# Patient Record
Sex: MALE | Race: OTHER
[De-identification: names, ages, dates, MRNs, and addresses within clinical notes are randomized per-mention and may not be internally consistent; named-entity substitution may affect disease eponyms.]

---

## 2020-01-02 ENCOUNTER — HOSPITAL ENCOUNTER (INPATIENT)
Dept: HOSPITAL 72 - EMR | Age: 65
LOS: 2 days | Discharge: LEFT BEFORE DISCHARGE | DRG: 378 | End: 2020-01-04
Payer: SELF-PAY

## 2020-01-02 VITALS — DIASTOLIC BLOOD PRESSURE: 64 MMHG | SYSTOLIC BLOOD PRESSURE: 112 MMHG

## 2020-01-02 VITALS — WEIGHT: 145 LBS | HEIGHT: 68 IN | BODY MASS INDEX: 21.98 KG/M2

## 2020-01-02 VITALS — DIASTOLIC BLOOD PRESSURE: 66 MMHG | SYSTOLIC BLOOD PRESSURE: 113 MMHG

## 2020-01-02 VITALS — DIASTOLIC BLOOD PRESSURE: 80 MMHG | SYSTOLIC BLOOD PRESSURE: 100 MMHG

## 2020-01-02 VITALS — SYSTOLIC BLOOD PRESSURE: 119 MMHG | DIASTOLIC BLOOD PRESSURE: 67 MMHG

## 2020-01-02 DIAGNOSIS — D62: ICD-10-CM

## 2020-01-02 DIAGNOSIS — E78.5: ICD-10-CM

## 2020-01-02 DIAGNOSIS — K92.2: Primary | ICD-10-CM

## 2020-01-02 DIAGNOSIS — E03.9: ICD-10-CM

## 2020-01-02 DIAGNOSIS — I10: ICD-10-CM

## 2020-01-02 DIAGNOSIS — D64.9: ICD-10-CM

## 2020-01-02 DIAGNOSIS — E11.9: ICD-10-CM

## 2020-01-02 LAB
ADD MANUAL DIFF: NO
ALBUMIN SERPL-MCNC: 3.4 G/DL (ref 3.4–5)
ALBUMIN/GLOB SERPL: 1.1 {RATIO} (ref 1–2.7)
ALP SERPL-CCNC: 57 U/L (ref 46–116)
ALT SERPL-CCNC: 47 U/L (ref 12–78)
ANION GAP SERPL CALC-SCNC: 13 MMOL/L (ref 5–15)
APPEARANCE UR: CLEAR
APTT BLD: 21 SEC (ref 23–33)
APTT PPP: YELLOW S
AST SERPL-CCNC: 15 U/L (ref 15–37)
BASOPHILS NFR BLD AUTO: 0.7 % (ref 0–2)
BILIRUB SERPL-MCNC: 0.2 MG/DL (ref 0.2–1)
BUN SERPL-MCNC: 40 MG/DL (ref 7–18)
CALCIUM SERPL-MCNC: 8.3 MG/DL (ref 8.5–10.1)
CHLORIDE SERPL-SCNC: 105 MMOL/L (ref 98–107)
CO2 SERPL-SCNC: 24 MMOL/L (ref 21–32)
CREAT SERPL-MCNC: 1.2 MG/DL (ref 0.55–1.3)
EOSINOPHIL NFR BLD AUTO: 1.7 % (ref 0–3)
ERYTHROCYTE [DISTWIDTH] IN BLOOD BY AUTOMATED COUNT: 10.8 % (ref 11.6–14.8)
GLOBULIN SER-MCNC: 3.2 G/DL
GLUCOSE UR STRIP-MCNC: NEGATIVE MG/DL
HCT VFR BLD CALC: 25.1 % (ref 42–52)
HGB BLD-MCNC: 8.9 G/DL (ref 14.2–18)
INR PPP: 0.9 (ref 0.9–1.1)
KETONES UR QL STRIP: NEGATIVE
LEUKOCYTE ESTERASE UR QL STRIP: (no result)
LYMPHOCYTES NFR BLD AUTO: 22.7 % (ref 20–45)
MCV RBC AUTO: 88 FL (ref 80–99)
MONOCYTES NFR BLD AUTO: 7.7 % (ref 1–10)
NEUTROPHILS NFR BLD AUTO: 67.3 % (ref 45–75)
NITRITE UR QL STRIP: NEGATIVE
PH UR STRIP: 5 [PH] (ref 4.5–8)
PLATELET # BLD: 239 K/UL (ref 150–450)
POTASSIUM SERPL-SCNC: 3.8 MMOL/L (ref 3.5–5.1)
PROT UR QL STRIP: (no result)
RBC # BLD AUTO: 2.86 M/UL (ref 4.7–6.1)
SODIUM SERPL-SCNC: 142 MMOL/L (ref 136–145)
SP GR UR STRIP: 1.01 (ref 1–1.03)
UROBILINOGEN UR-MCNC: NORMAL MG/DL (ref 0–1)
WBC # BLD AUTO: 14.1 K/UL (ref 4.8–10.8)

## 2020-01-02 PROCEDURE — 96361 HYDRATE IV INFUSION ADD-ON: CPT

## 2020-01-02 PROCEDURE — 86900 BLOOD TYPING SEROLOGIC ABO: CPT

## 2020-01-02 PROCEDURE — 80053 COMPREHEN METABOLIC PANEL: CPT

## 2020-01-02 PROCEDURE — 71045 X-RAY EXAM CHEST 1 VIEW: CPT

## 2020-01-02 PROCEDURE — 99285 EMERGENCY DEPT VISIT HI MDM: CPT

## 2020-01-02 PROCEDURE — 85007 BL SMEAR W/DIFF WBC COUNT: CPT

## 2020-01-02 PROCEDURE — 80048 BASIC METABOLIC PNL TOTAL CA: CPT

## 2020-01-02 PROCEDURE — 86901 BLOOD TYPING SEROLOGIC RH(D): CPT

## 2020-01-02 PROCEDURE — 86850 RBC ANTIBODY SCREEN: CPT

## 2020-01-02 PROCEDURE — 96374 THER/PROPH/DIAG INJ IV PUSH: CPT

## 2020-01-02 PROCEDURE — 80061 LIPID PANEL: CPT

## 2020-01-02 PROCEDURE — 83036 HEMOGLOBIN GLYCOSYLATED A1C: CPT

## 2020-01-02 PROCEDURE — 85025 COMPLETE CBC W/AUTO DIFF WBC: CPT

## 2020-01-02 PROCEDURE — 86920 COMPATIBILITY TEST SPIN: CPT

## 2020-01-02 PROCEDURE — 85730 THROMBOPLASTIN TIME PARTIAL: CPT

## 2020-01-02 PROCEDURE — 84484 ASSAY OF TROPONIN QUANT: CPT

## 2020-01-02 PROCEDURE — 82962 GLUCOSE BLOOD TEST: CPT

## 2020-01-02 PROCEDURE — 36415 COLL VENOUS BLD VENIPUNCTURE: CPT

## 2020-01-02 PROCEDURE — 85610 PROTHROMBIN TIME: CPT

## 2020-01-02 PROCEDURE — 93005 ELECTROCARDIOGRAM TRACING: CPT

## 2020-01-02 PROCEDURE — 83690 ASSAY OF LIPASE: CPT

## 2020-01-02 PROCEDURE — 84443 ASSAY THYROID STIM HORMONE: CPT

## 2020-01-02 PROCEDURE — 81003 URINALYSIS AUTO W/O SCOPE: CPT

## 2020-01-02 RX ADMIN — INSULIN ASPART SCH UNITS: 100 INJECTION, SOLUTION INTRAVENOUS; SUBCUTANEOUS at 22:35

## 2020-01-02 RX ADMIN — PANTOPRAZOLE SODIUM SCH MG: 40 INJECTION, POWDER, FOR SOLUTION INTRAVENOUS at 22:16

## 2020-01-02 NOTE — NUR
NURSE NOTES:

Received report from BALTAZAR Shin @ ER. The patient's belongings checked including cash and 
denture with the patient and two nurses and signed by two nurses. Medical, surgical, 
allergy, and social history taken by the nurse. Admitting EKG and medication reconciliation 
completed @ ER. The patient has R AC 18G intact and patent. The patient's skin is intact. 
The patient does not have POLST for advanced directive. The patient's vital signs taken on 
the floor. The patient's vital signs are stable. Notified Dr. Todd regarding admission 
for upper GI bleed. Notified Dr. Todd regarding abnormal lab and stable vital signs. Will 
wait for admission order. Will continue plan of care.

## 2020-01-02 NOTE — NUR
HAND-OFF: 

Report given to BALTAZAR Alvarez. The patient is resting on the bed without acute distress or 
shortness of breath. The patient's bed in the lowest position, call light in reach, and fall 
and aspiration precaution reinforced. IV site intact and patent. No admission order given 
from Dr. Todd. Endorsed plan of care.

## 2020-01-02 NOTE — EMERGENCY ROOM REPORT
History of Present Illness


General


Source:  Patient, EMS





Present Illness


HPI


Patient is a 64-year-old male brought in by EMS after increased hematemesis.  

Patient reportedly had been vomiting dark bloody material.  He reports having a 

prior history of diabetes as well as hypertension.  He denies any alcohol use 

since December 24.  Patient was sent in from a urgent care where he was noted 

to have some coffee-ground emesis approximately 200 cc.


Allergies:  


Coded Allergies:  


     No Known Allergies (Unverified , 1/2/20)





Patient History


Reviewed Nursing Documentation:  PMH: Agreed; PSxH: Agreed





Review of Systems


All Other Systems:  negative except mentioned in HPI





Physical Exam


Sp02 EP Interpretation:  reviewed, normal


General Appearance:  normal inspection, alert, GCS 15


Head:  atraumatic


ENT:  normal ENT inspection, hearing grossly normal, normal voice


Neck:  normal inspection, full range of motion, supple, no bony tend


Respiratory:  normal inspection, lungs clear, normal breath sounds, no 

respiratory distress, no retraction, no wheezing


Cardiovascular #1:  regular rate, rhythm, no edema


Gastrointestinal:  normal inspection, normal bowel sounds, non tender, soft, no 

guarding, no hernia


Genitourinary:  no CVA tenderness


Musculoskeletal:  normal inspection, back normal, normal range of motion


Neurologic:  alert, CNs III-XII nml as tested, oriented x3, responsive, speech 

normal, normal inspection


Psychiatric:  normal inspection, judgement/insight normal, mood/affect normal


Skin:  no rash





Medical Decision Making


Diagnostic Impression:  


 Primary Impression:  


 Upper GI bleeding


 Additional Impression:  


 Anemia


ER Course


Patient presented for hematemesis.  Differential diagnosis include was not 

limited to gastritis, ulcer, coagulopathy, esophageal varices among others.  

Because of complexity of patient's case laboratory tests and imaging studies 

were ordered.Patient's laboratory testing did show some evidence of anemia.  

Patient was advised of laboratory testing.  Due to patient's initial bleeding 

he was started on IV Protonix.  Dr. Todd was contacted for inpatient 

management due to need for inpatient monitoring and treatment.





Labs








Test


  1/2/20


14:09 1/2/20


15:11


 


White Blood Count


  14.1 K/UL


(4.8-10.8) 


 


 


Red Blood Count


  2.86 M/UL


(4.70-6.10) 


 


 


Hemoglobin


  8.9 G/DL


(14.2-18.0) 


 


 


Hematocrit


  25.1 %


(42.0-52.0) 


 


 


Mean Corpuscular Volume 88 FL (80-99)  


 


Mean Corpuscular Hemoglobin


  31.3 PG


(27.0-31.0) 


 


 


Mean Corpuscular Hemoglobin


Concent 35.7 G/DL


(32.0-36.0) 


 


 


Red Cell Distribution Width


  10.8 %


(11.6-14.8) 


 


 


Platelet Count


  239 K/UL


(150-450) 


 


 


Mean Platelet Volume


  6.8 FL


(6.5-10.1) 


 


 


Neutrophils (%) (Auto)


  67.3 %


(45.0-75.0) 


 


 


Lymphocytes (%) (Auto)


  22.7 %


(20.0-45.0) 


 


 


Monocytes (%) (Auto)


  7.7 %


(1.0-10.0) 


 


 


Eosinophils (%) (Auto)


  1.7 %


(0.0-3.0) 


 


 


Basophils (%) (Auto)


  0.7 %


(0.0-2.0) 


 


 


Prothrombin Time


  10.0 SEC


(9.30-11.50) 


 


 


Prothromb Time International


Ratio 0.9 (0.9-1.1) 


  


 


 


Activated Partial


Thromboplast Time 21 SEC (23-33) 


  


 


 


Sodium Level


  142 MMOL/L


(136-145) 


 


 


Potassium Level


  3.8 MMOL/L


(3.5-5.1) 


 


 


Chloride Level


  105 MMOL/L


() 


 


 


Carbon Dioxide Level


  24 MMOL/L


(21-32) 


 


 


Anion Gap


  13 mmol/L


(5-15) 


 


 


Blood Urea Nitrogen


  40 mg/dL


(7-18) 


 


 


Creatinine


  1.2 MG/DL


(0.55-1.30) 


 


 


Estimat Glomerular Filtration


Rate > 60 mL/min


(>60) 


 


 


Glucose Level


  181 MG/DL


() 


 


 


Calcium Level


  8.3 MG/DL


(8.5-10.1) 


 


 


Total Bilirubin


  0.2 MG/DL


(0.2-1.0) 


 


 


Aspartate Amino Transf


(AST/SGOT) 15 U/L (15-37) 


  


 


 


Alanine Aminotransferase


(ALT/SGPT) 47 U/L (12-78) 


  


 


 


Alkaline Phosphatase


  57 U/L


() 


 


 


Troponin I


  0.000 ng/mL


(0.000-0.056) 


 


 


Total Protein


  6.6 G/DL


(6.4-8.2) 


 


 


Albumin


  3.4 G/DL


(3.4-5.0) 


 


 


Globulin 3.2 g/dL  


 


Albumin/Globulin Ratio 1.1 (1.0-2.7)  


 


Lipase


  194 U/L


() 


 


 


Serum Alcohol < 3 mg/dL  


 


Urine Color  Yellow 


 


Urine Appearance  Clear 


 


Urine pH  5 (4.5-8.0) 


 


Urine Specific Gravity


  


  1.015


(1.005-1.035)


 


Urine Protein  1+ (NEGATIVE) 


 


Urine Glucose (UA)


  


  Negative


(NEGATIVE)


 


Urine Ketones


  


  Negative


(NEGATIVE)


 


Urine Blood


  


  Negative


(NEGATIVE)


 


Urine Nitrite


  


  Negative


(NEGATIVE)


 


Urine Bilirubin


  


  Negative


(NEGATIVE)


 


Urine Urobilinogen


  


  Normal MG/DL


(0.0-1.0)


 


Urine Leukocyte Esterase  1+ (NEGATIVE) 


 


Urine RBC


  


  0-2 /HPF (0 -


0)


 


Urine WBC


  


  2-4 /HPF (0 -


0)


 


Urine Squamous Epithelial


Cells 


  Few /LPF


(NONE/OCC)


 


Urine Bacteria


  


  Few /HPF


(NONE)








Status:  improved


Disposition:  ADMITTED AS INPATIENT


Condition:  Stable











Doroteo Rodas MD Jan 2, 2020 14:02

## 2020-01-02 NOTE — NUR
TRANSFER TO FLOOR:

Patient transferred to tele  as ordered, per ermd.   Report given to michael morales. 
 Belongings given to pt.

## 2020-01-02 NOTE — NUR
ED Nurse Note:



PT AMBULATED TO ED C/O NAUSEA VOMITING WITH BLOOD. PT REPORTS ADBOMEN PAIN. NO 
ACUTE DISTRESS NOTED. IV LINES ESTABLISHED, PATENT AND INTACT. BLOOD SPECIMEN 
SENT TO LAB

## 2020-01-02 NOTE — DIAGNOSTIC IMAGING REPORT
Indication: Cough

 

Technique: One view of the chest

 

Comparison: none

 

Findings: Lungs and pleural spaces are clear. Heart size is normal.

 

Impression: No acute process

## 2020-01-02 NOTE — NUR
ED Nurse Note:



PT VSS, PT IS COMFORTABLE IN BED; ON HIS PHONE. DENIES PAIN AT THIS TIME. 
AWAITING FURTHER ORDERS WILL CONTINUE TO MONITOR

## 2020-01-03 VITALS — SYSTOLIC BLOOD PRESSURE: 103 MMHG | DIASTOLIC BLOOD PRESSURE: 56 MMHG

## 2020-01-03 VITALS — DIASTOLIC BLOOD PRESSURE: 82 MMHG | SYSTOLIC BLOOD PRESSURE: 136 MMHG

## 2020-01-03 VITALS — DIASTOLIC BLOOD PRESSURE: 55 MMHG | SYSTOLIC BLOOD PRESSURE: 103 MMHG

## 2020-01-03 VITALS — SYSTOLIC BLOOD PRESSURE: 106 MMHG | DIASTOLIC BLOOD PRESSURE: 78 MMHG

## 2020-01-03 VITALS — DIASTOLIC BLOOD PRESSURE: 77 MMHG | SYSTOLIC BLOOD PRESSURE: 119 MMHG

## 2020-01-03 VITALS — DIASTOLIC BLOOD PRESSURE: 59 MMHG | SYSTOLIC BLOOD PRESSURE: 98 MMHG

## 2020-01-03 LAB
ADD MANUAL DIFF: YES
ANION GAP SERPL CALC-SCNC: 6 MMOL/L (ref 5–15)
BUN SERPL-MCNC: 27 MG/DL (ref 7–18)
CALCIUM SERPL-MCNC: 7.9 MG/DL (ref 8.5–10.1)
CHLORIDE SERPL-SCNC: 105 MMOL/L (ref 98–107)
CHOLEST SERPL-MCNC: 110 MG/DL (ref ?–200)
CO2 SERPL-SCNC: 30 MMOL/L (ref 21–32)
CREAT SERPL-MCNC: 1.1 MG/DL (ref 0.55–1.3)
ERYTHROCYTE [DISTWIDTH] IN BLOOD BY AUTOMATED COUNT: 11.6 % (ref 11.6–14.8)
HCT VFR BLD CALC: 19.7 % (ref 42–52)
HDLC SERPL-MCNC: 27 MG/DL (ref 40–60)
HGB BLD-MCNC: 7.2 G/DL (ref 14.2–18)
MCV RBC AUTO: 88 FL (ref 80–99)
PLATELET # BLD: 156 K/UL (ref 150–450)
POTASSIUM SERPL-SCNC: 4 MMOL/L (ref 3.5–5.1)
RBC # BLD AUTO: 2.24 M/UL (ref 4.7–6.1)
SODIUM SERPL-SCNC: 140 MMOL/L (ref 136–145)
TRIGL SERPL-MCNC: 256 MG/DL (ref 30–150)
WBC # BLD AUTO: 9.5 K/UL (ref 4.8–10.8)

## 2020-01-03 PROCEDURE — 30233N1 TRANSFUSION OF NONAUTOLOGOUS RED BLOOD CELLS INTO PERIPHERAL VEIN, PERCUTANEOUS APPROACH: ICD-10-PCS

## 2020-01-03 RX ADMIN — PANTOPRAZOLE SODIUM SCH MG: 40 INJECTION, POWDER, FOR SOLUTION INTRAVENOUS at 09:22

## 2020-01-03 RX ADMIN — PANTOPRAZOLE SODIUM SCH MG: 40 INJECTION, POWDER, FOR SOLUTION INTRAVENOUS at 21:00

## 2020-01-03 RX ADMIN — INSULIN ASPART SCH UNITS: 100 INJECTION, SOLUTION INTRAVENOUS; SUBCUTANEOUS at 11:30

## 2020-01-03 RX ADMIN — METFORMIN HYDROCHLORIDE SCH MG: 500 TABLET ORAL at 12:00

## 2020-01-03 RX ADMIN — METFORMIN HYDROCHLORIDE SCH MG: 500 TABLET ORAL at 16:57

## 2020-01-03 RX ADMIN — INSULIN ASPART SCH UNITS: 100 INJECTION, SOLUTION INTRAVENOUS; SUBCUTANEOUS at 06:00

## 2020-01-03 RX ADMIN — INSULIN ASPART SCH UNITS: 100 INJECTION, SOLUTION INTRAVENOUS; SUBCUTANEOUS at 17:07

## 2020-01-03 RX ADMIN — INSULIN ASPART SCH UNITS: 100 INJECTION, SOLUTION INTRAVENOUS; SUBCUTANEOUS at 21:00

## 2020-01-03 NOTE — GENERAL PROGRESS NOTE
Assessment/Plan


Problem List:  


(1) Upper GI bleeding


ICD Codes:  K92.2 - Gastrointestinal hemorrhage, unspecified


SNOMED:  74532997


(2) Anemia


ICD Codes:  D64.9 - Anemia, unspecified


SNOMED:  521833948


Status:  stable


Assessment/Plan:


EGD was cancelled for today given patient has food


plan transfuse


ppi IV


plan EGD on Monday





Subjective


Allergies:  


Coded Allergies:  


     No Known Allergies (Unverified , 1/2/20)





Objective





Last 24 Hour Vital Signs








  Date Time  Temp Pulse Resp B/P (MAP) Pulse Ox O2 Delivery O2 Flow Rate FiO2


 


1/3/20 11:57 98.4 76 16 106/78 (87) 98   


 


1/3/20 09:00    103/56    


 


1/3/20 09:00      Room Air  


 


1/3/20 08:54 98.9 82 16 103/56 (72) 98   


 


1/3/20 08:00  82      


 


1/3/20 04:00  79      


 


1/3/20 04:00 98.9 82 16 103/55 (71) 98   


 


1/3/20 00:00 98.0 86 16 98/59 (72) 96   


 


1/2/20 21:00      Room Air  


 


1/2/20 21:00      Room Air  


 


1/2/20 20:00 99.4 76 16 112/64 (80) 96   


 


1/2/20 18:14 98.7 89 22 99/59 100 Room Air  


 


1/2/20 18:00 97.9 89 18 119/67 (84) 98   


 


1/2/20 15:57 98.7 90 17 113/66 100 Room Air  


 


1/2/20 14:00  84 16   Room Air  


 


1/2/20 14:00 98.8 84 16 100/80 (87) 98 Room Air  


 


1/2/20 14:00 98.8 84 16 100/80 98 Room Air  

















Intake and Output  


 


 1/2/20 1/3/20





 19:00 07:00


 


Intake Total 1000 ml 360 ml


 


Balance 1000 ml 360 ml


 


  


 


Intake Oral  360 ml


 


IV Total 1000 ml 


 


# Voids 1 2








Laboratory Tests


1/2/20 14:09: 


White Blood Count 14.1H, Red Blood Count 2.86L, Hemoglobin 8.9L, Hematocrit 

25.1L, Mean Corpuscular Volume 88, Mean Corpuscular Hemoglobin 31.3H, Mean 

Corpuscular Hemoglobin Concent 35.7, Red Cell Distribution Width 10.8L, 

Platelet Count 239, Mean Platelet Volume 6.8, Neutrophils (%) (Auto) 67.3, 

Lymphocytes (%) (Auto) 22.7, Monocytes (%) (Auto) 7.7, Eosinophils (%) (Auto) 

1.7, Basophils (%) (Auto) 0.7, Prothrombin Time 10.0, Prothromb Time 

International Ratio 0.9, Activated Partial Thromboplast Time 21L, Sodium Level 

142, Potassium Level 3.8, Chloride Level 105, Carbon Dioxide Level 24, Anion 

Gap 13, Blood Urea Nitrogen 40H, Creatinine 1.2, Estimat Glomerular Filtration 

Rate > 60, Glucose Level 181H, Hemoglobin A1c 7.0H, Calcium Level 8.3L, Total 

Bilirubin 0.2, Aspartate Amino Transf (AST/SGOT) 15, Alanine Aminotransferase (

ALT/SGPT) 47, Alkaline Phosphatase 57, Troponin I 0.000, Total Protein 6.6, 

Albumin 3.4, Globulin 3.2, Albumin/Globulin Ratio 1.1, Lipase 194, Thyroid 

Stimulating Hormone (TSH) 5.143H, Serum Alcohol < 3


1/2/20 15:11: 


Urine Color Yellow, Urine Appearance Clear, Urine pH 5, Urine Specific Gravity 

1.015, Urine Protein 1+H, Urine Glucose (UA) Negative, Urine Ketones Negative, 

Urine Blood Negative, Urine Nitrite Negative, Urine Bilirubin Negative, Urine 

Urobilinogen Normal, Urine Leukocyte Esterase 1+H, Urine RBC 0-2H, Urine WBC 2-4

, Urine Squamous Epithelial Cells Few, Urine Bacteria Few


1/3/20 05:50: 


White Blood Count 9.5, Red Blood Count 2.24L, Hemoglobin 7.2L, Hematocrit 19.7L

, Mean Corpuscular Volume 88, Mean Corpuscular Hemoglobin 32.2H, Mean 

Corpuscular Hemoglobin Concent 36.6H, Red Cell Distribution Width 11.6, 

Platelet Count 156, Mean Platelet Volume 6.4L, Neutrophils (%) (Auto) , 

Lymphocytes (%) (Auto) , Monocytes (%) (Auto) , Eosinophils (%) (Auto) , 

Basophils (%) (Auto) , Sodium Level 140, Potassium Level 4.0, Chloride Level 105

, Carbon Dioxide Level 30, Anion Gap 6, Blood Urea Nitrogen 27H, Creatinine 1.1

, Estimat Glomerular Filtration Rate > 60, Glucose Level 112H, Calcium Level 

7.9L, Differential Total Cells Counted 100, Neutrophils % (Manual) 61, 

Lymphocytes % (Manual) 32, Monocytes % (Manual) 5, Eosinophils % (Manual) 2, 

Basophils % (Manual) 0, Band Neutrophils 0, Platelet Estimate Adequate, 

Platelet Morphology Normal, Anisocytosis 1+, Triglycerides Level 256H, 

Cholesterol Level 110, LDL Cholesterol 35, HDL Cholesterol 27L, Cholesterol/HDL 

Ratio 4.1


Height (Feet):  5


Height (Inches):  8.00


Weight (Pounds):  145


General Appearance:  alert


EENT:  normal ENT inspection


Neck:  supple


Cardiovascular:  normal rate


Respiratory/Chest:  lungs clear


Abdomen:  normal bowel sounds, non tender, soft


Extremities:  non-tender











Pipo Hernandez MD Kurtis 3, 2020 12:28

## 2020-01-03 NOTE — HISTORY & PHYSICAL
History of Present Illness


General


Date patient seen:  Kurtis 3, 2020


Reason for Hospitalization:  Abdominal Pain





Present Illness


HPI


History of Present Illness


General


Source:  Patient, EMS





Present Illness


HPI


Patient is a 64-year-old male brought in by EMS after increased hematemesis.  

Patient reportedly had been vomiting dark bloody material.  He reports having a 

prior history of diabetes as well as hypertension.  He denies any alcohol use 

since December 24.  Patient was sent in from a urgent care where he was noted 

to have some coffee-ground emesis approximately 200 cc.


Allergies:  


Coded Allergies:  


     No Known Allergies (Unverified , 1/2/20)





Patient History


Reviewed Nursing Documentation:  PMH: Agreed; PSxH: Agreed





 ER ROS - General 


Review of Systems


All Other Systems:  negative except mentioned in HPI


Allergies:  


Coded Allergies:  


     No Known Allergies (Unverified , 1/2/20)





Medication History


Scheduled


Lisinopril (Lisinopril*), Unknown Dose ORAL DAILY, (Reported)


Metformin Hcl* (Metformin Hcl*), 500 MG ORAL DAILY, (Reported)





Patient History


Healthcare decision maker


N


Resuscitation status





Advanced Directive on File








Review of Systems


Review of Symptoms


General ROS: no weight loss or fever


Psychological ROS: no depression or mood changes, no memory loss


Ophthalmic ROS: no visual changes or eye irritation


ENT ROS: no nasal congestion, hearing loss, dizziness


Allergy and Immunology ROS: no allergic symptoms or urticaria


Hematological and Lymphatic ROS: no swollen glands, unusual bleeding or bruising


Endocrine ROS: no polyuria, polydipsia, weight changes, temperature intolerance


Respiratory ROS: no cough, shortness of breath, or wheezing


Cardiovascular ROS: no chest pain or dyspnea on exertion


Gastrointestinal ROS: denies abdominal pain, bright red blood in stool.


Musculoskeletal ROS: no myalgias or arthralgias


Neurological ROS: no TIA or stroke symptoms


Dermatological ROS: no new or changing skin lesions, rashes or pruritis





Physical Exam


Physical Exam


General appearance:  alert, cooperative, no distress, appears stated age


Head:  Normocephalic, without obvious abnormality, atraumatic


Eyes:  conjunctivae/corneas clear. PERRL, EOM's intact. Fundi benign


Throat:  Lips, mucosa, and tongue normal. Teeth and gums normal


Neck:  supple, symmetrical, trachea midline, no adenopathy, thyroid: not 

enlarged, symmetric, no tenderness/mass/nodules, no carotid bruit and no JVD


Lungs:  clear to auscultation bilaterally


Heart:  regular rate and rhythm, S1, S2 normal, no murmur, click, rub or gallop


Abdomen:  soft, non-tender. Bowel sounds normal. No masses,  no organomegaly


Extremities:  extremities normal, atraumatic, no cyanosis or edema


Pulses:  2+ and symmetric


Skin:  Skin color, texture, turgor normal. No rashes or lesions


Neurologic:  Grossly normal





Last 24 Hour Vital Signs








  Date Time  Temp Pulse Resp B/P (MAP) Pulse Ox O2 Delivery O2 Flow Rate FiO2


 


1/3/20 09:00    103/56    


 


1/3/20 09:00      Room Air  


 


1/3/20 08:54 98.9 82 16 103/56 (72) 98   


 


1/3/20 08:00  82      


 


1/3/20 04:00  79      


 


1/3/20 04:00 98.9 82 16 103/55 (71) 98   


 


1/3/20 00:00 98.0 86 16 98/59 (72) 96   


 


1/2/20 21:00      Room Air  


 


1/2/20 21:00      Room Air  


 


1/2/20 20:00 99.4 76 16 112/64 (80) 96   


 


1/2/20 18:14 98.7 89 22 99/59 100 Room Air  


 


1/2/20 18:00 97.9 89 18 119/67 (84) 98   


 


1/2/20 15:57 98.7 90 17 113/66 100 Room Air  


 


1/2/20 14:00  84 16   Room Air  


 


1/2/20 14:00 98.8 84 16 100/80 (87) 98 Room Air  


 


1/2/20 14:00 98.8 84 16 100/80 98 Room Air  

















Intake and Output  


 


 1/2/20 1/3/20





 19:00 07:00


 


Intake Total 1000 ml 360 ml


 


Balance 1000 ml 360 ml


 


  


 


Intake Oral  360 ml


 


IV Total 1000 ml 


 


# Voids 1 2











Laboratory Tests








Test


  1/2/20


14:09 1/2/20


15:11 1/3/20


05:50


 


White Blood Count


  14.1 K/UL


(4.8-10.8)  H 


  9.5 K/UL


(4.8-10.8)


 


Red Blood Count


  2.86 M/UL


(4.70-6.10)  L 


  2.24 M/UL


(4.70-6.10)  L


 


Hemoglobin


  8.9 G/DL


(14.2-18.0)  L 


  7.2 G/DL


(14.2-18.0)  L


 


Hematocrit


  25.1 %


(42.0-52.0)  L 


  19.7 %


(42.0-52.0)  L


 


Mean Corpuscular Volume 88 FL (80-99)    88 FL (80-99)  


 


Mean Corpuscular Hemoglobin


  31.3 PG


(27.0-31.0)  H 


  32.2 PG


(27.0-31.0)  H


 


Mean Corpuscular Hemoglobin


Concent 35.7 G/DL


(32.0-36.0) 


  36.6 G/DL


(32.0-36.0)  H


 


Red Cell Distribution Width


  10.8 %


(11.6-14.8)  L 


  11.6 %


(11.6-14.8)


 


Platelet Count


  239 K/UL


(150-450) 


  156 K/UL


(150-450)


 


Mean Platelet Volume


  6.8 FL


(6.5-10.1) 


  6.4 FL


(6.5-10.1)  L


 


Neutrophils (%) (Auto)


  67.3 %


(45.0-75.0) 


  % (45.0-75.0)


 


 


Lymphocytes (%) (Auto)


  22.7 %


(20.0-45.0) 


  % (20.0-45.0)


 


 


Monocytes (%) (Auto)


  7.7 %


(1.0-10.0) 


   % (1.0-10.0)  


 


 


Eosinophils (%) (Auto)


  1.7 %


(0.0-3.0) 


   % (0.0-3.0)  


 


 


Basophils (%) (Auto)


  0.7 %


(0.0-2.0) 


   % (0.0-2.0)  


 


 


Prothrombin Time


  10.0 SEC


(9.30-11.50) 


  


 


 


Prothromb Time International


Ratio 0.9 (0.9-1.1)  


  


  


 


 


Activated Partial


Thromboplast Time 21 SEC (23-33)


L 


  


 


 


Sodium Level


  142 MMOL/L


(136-145) 


  140 MMOL/L


(136-145)


 


Potassium Level


  3.8 MMOL/L


(3.5-5.1) 


  4.0 MMOL/L


(3.5-5.1)


 


Chloride Level


  105 MMOL/L


() 


  105 MMOL/L


()


 


Carbon Dioxide Level


  24 MMOL/L


(21-32) 


  30 MMOL/L


(21-32)


 


Anion Gap


  13 mmol/L


(5-15) 


  6 mmol/L


(5-15)


 


Blood Urea Nitrogen


  40 mg/dL


(7-18)  H 


  27 mg/dL


(7-18)  H


 


Creatinine


  1.2 MG/DL


(0.55-1.30) 


  1.1 MG/DL


(0.55-1.30)


 


Estimat Glomerular Filtration


Rate > 60 mL/min


(>60) 


  > 60 mL/min


(>60)


 


Glucose Level


  181 MG/DL


()  H 


  112 MG/DL


()  H


 


Hemoglobin A1c


  7.0 %


(4.3-6.0)  H 


  


 


 


Calcium Level


  8.3 MG/DL


(8.5-10.1)  L 


  7.9 MG/DL


(8.5-10.1)  L


 


Total Bilirubin


  0.2 MG/DL


(0.2-1.0) 


  


 


 


Aspartate Amino Transf


(AST/SGOT) 15 U/L (15-37)


  


  


 


 


Alanine Aminotransferase


(ALT/SGPT) 47 U/L (12-78)


  


  


 


 


Alkaline Phosphatase


  57 U/L


() 


  


 


 


Troponin I


  0.000 ng/mL


(0.000-0.056) 


  


 


 


Total Protein


  6.6 G/DL


(6.4-8.2) 


  


 


 


Albumin


  3.4 G/DL


(3.4-5.0) 


  


 


 


Globulin 3.2 g/dL    


 


Albumin/Globulin Ratio 1.1 (1.0-2.7)    


 


Lipase


  194 U/L


() 


  


 


 


Thyroid Stimulating Hormone


(TSH) 5.143 uiU/mL


(0.358-3.740) 


  


 


 


Serum Alcohol < 3 mg/dL    


 


Urine Color  Yellow   


 


Urine Appearance  Clear   


 


Urine pH  5 (4.5-8.0)   


 


Urine Specific Gravity


  


  1.015


(1.005-1.035) 


 


 


Urine Protein


  


  1+ (NEGATIVE)


H 


 


 


Urine Glucose (UA)


  


  Negative


(NEGATIVE) 


 


 


Urine Ketones


  


  Negative


(NEGATIVE) 


 


 


Urine Blood


  


  Negative


(NEGATIVE) 


 


 


Urine Nitrite


  


  Negative


(NEGATIVE) 


 


 


Urine Bilirubin


  


  Negative


(NEGATIVE) 


 


 


Urine Urobilinogen


  


  Normal MG/DL


(0.0-1.0) 


 


 


Urine Leukocyte Esterase


  


  1+ (NEGATIVE)


H 


 


 


Urine RBC


  


  0-2 /HPF (0 -


0)  H 


 


 


Urine WBC


  


  2-4 /HPF (0 -


0) 


 


 


Urine Squamous Epithelial


Cells 


  Few /LPF


(NONE/OCC) 


 


 


Urine Bacteria


  


  Few /HPF


(NONE) 


 


 


Differential Total Cells


Counted 


  


  100  


 


 


Neutrophils % (Manual)   61 % (45-75)  


 


Lymphocytes % (Manual)   32 % (20-45)  


 


Monocytes % (Manual)   5 % (1-10)  


 


Eosinophils % (Manual)   2 % (0-3)  


 


Basophils % (Manual)   0 % (0-2)  


 


Band Neutrophils   0 % (0-8)  


 


Platelet Estimate   Adequate  


 


Platelet Morphology   Normal  


 


Anisocytosis   1+  


 


Triglycerides Level


  


  


  256 MG/DL


()  H


 


Cholesterol Level


  


  


  110 MG/DL (<


200)


 


LDL Cholesterol


  


  


  35 mg/dL


(<100)


 


HDL Cholesterol


  


  


  27 MG/DL


(40-60)  L


 


Cholesterol/HDL Ratio   4.1 (3.3-4.4)  








Height (Feet):  5


Height (Inches):  8.00


Weight (Pounds):  145


Medications





Current Medications








 Medications


  (Trade)  Dose


 Ordered  Sig/Jacky


 Route


 PRN Reason  Start Time


 Stop Time Status Last Admin


Dose Admin


 


 Acetaminophen


  (Tylenol)  650 mg  Q6H  PRN


 ORAL


 Mild Pain/Temp > 100.5  1/2/20 20:30


 2/1/20 20:29   


 


 


 Dextrose


  (Dextrose 50%)  25 ml  Q30M  PRN


 IV


 Hypoglycemia  1/2/20 20:00


 2/1/20 19:59   


 


 


 Dextrose


  (Dextrose 50%)  50 ml  Q30M  PRN


 IV


 Hypoglycemia  1/2/20 20:00


 2/1/20 19:59   


 


 


 Insulin Aspart


  (NovoLOG)    BEFORE MEALS AND  HS


 SUBQ


   1/2/20 22:00


 2/1/20 21:59  1/2/20 22:35


 


 


 Lisinopril


  (ZestriL)  5 mg  DAILY


 ORAL


   1/3/20 09:00


 2/2/20 08:59   


 


 


 Pantoprazole


  (Protonix)  40 mg  EVERY 12  HOURS


 IVP


   1/2/20 21:00


 2/1/20 20:59  1/3/20 09:22


 


 


 Temazepam


  (Restoril)  15 mg  HSPRN  PRN


 ORAL


 Insomnia  1/2/20 20:30


 1/9/20 20:29   


 











Assessment/Plan


Status:  stable


Diagnosis


Axis I:


A/P


1. Acute Anemia


2. DM


3. Hypothyroidism


4. HTN





Plan: 


GI consulted





[facility]


Hospital declaration


  INPATIENT level of care is warranted for this patient because patient is a 89 

year old with *** who presents with suspicion of ***. I have a high level of 

concern because ***. Patient is at high risk for ***. Plan of care/treatment 

include ***. Patient care is expected to be greater than 2 midnights.  





  OBSERVATION level of care is warranted for this patient. Patient is a 89 year 

old with *** who presents with ***. Patient will be admitted for 1 midnight, 

but if additional night(s) is/are necessary, patient will be converted to 

inpatient status for the entire hospitalization





Disposition: Once the patient is stable to leave the hospital, I anticipate the 

patient will likely be discharged to the following environment:***





Estimated discharge date: ***





I spent 70 minutes on this patient's case, and *** minutes was dedicated to 

counseling and/or care coordination. 








[employer] ([employer] System) 


Applicable CPT: 73224, 66487





CHECK ALL THAT ARE MET:





  Measure #5 (CHF): All ages. Prescribe ACE/ARB upon discharge for patients 

with left ventricular systolic dysfunction. If not, the reason is clearly 

documented in the medical chart.





  Measure #8 (CHF): All ages. Prescribe a beta blocker upon discharge for 

patients with left ventricular systolic dysfunction. If not, the reason is 

clearly documented in the medical chart.





  Measure #47 Advance care plan or surrogate decision maker documented in the 

medical record. 





  Measure #130 The provider has documented, updated, or reviewed the patients 

current medication list and has documented it in the patients note.  





  Measure #374 (All): Send report to referring provider. 





  Measure #407(Sepsis due to MSSA bacteremia): Age 18+ Patient treated with a 

beta-lactam antibiotic (Nafcillin, Oxacillin or Cefazolin) as definitive 

therapy. 








MEDICAL COMPLEXITY


High complexity medical decision making (need 2/3 categories)





Problem - need 4 points


  Acute/new problem with new plan for workup (4 points, 1 max)


  Acute/new problem without additional workup (3 points, 1 max)


  Unstable chronic problem actively being managed (2 point each, 2 max)


  Stable chronic problem actively being managed (1 point each, 2 max)


  Self-limited/transient process (constipation, muscle ache, etc) (1 point each

, 2 max)


 





Data - need 4 points


  Reviewed labs/imaging studies (1 points, 2 max)


  Independent review of imaging (EKG, xrays, etc) (2 points, 2 max)


  Discussed case with consult/other MD/RN (2 points, 2 max)





High Risk - qualify if have one of the following:


  Severe exacerbation of acute problem, acute mental status change, IV narcotics

, monitoring drug levels (vancomycin, INR, tacrolimus etc)











Maegan Todd MD Kurtis 3, 2020 11:51

## 2020-01-03 NOTE — CONSULTATION
History of Present Illness


General


Chief Complaint:  Abdominal Pain





Present Illness


Allergies:  


Coded Allergies:  


     No Known Allergies (Unverified , 1/2/20)





Medication History


Scheduled


Lisinopril (Lisinopril*), Unknown Dose ORAL DAILY, (Reported)


Metformin Hcl* (Metformin Hcl*), 500 MG ORAL DAILY, (Reported)





Patient History


Healthcare decision maker


N


Resuscitation status





Advanced Directive on File








Physical Exam





Last 24 Hour Vital Signs








  Date Time  Temp Pulse Resp B/P (MAP) Pulse Ox O2 Delivery O2 Flow Rate FiO2


 


1/3/20 04:00  79      


 


1/3/20 04:00 98.9 82 16 103/55 (71) 98   


 


1/3/20 00:00 98.0 86 16 98/59 (72) 96   


 


1/2/20 21:00      Room Air  


 


1/2/20 21:00      Room Air  


 


1/2/20 20:00 99.4 76 16 112/64 (80) 96   


 


1/2/20 18:14 98.7 89 22 99/59 100 Room Air  


 


1/2/20 18:00 97.9 89 18 119/67 (84) 98   


 


1/2/20 15:57 98.7 90 17 113/66 100 Room Air  


 


1/2/20 14:00  84 16   Room Air  


 


1/2/20 14:00 98.8 84 16 100/80 (87) 98 Room Air  


 


1/2/20 14:00 98.8 84 16 100/80 98 Room Air  

















Intake and Output  


 


 1/2/20 1/3/20





 19:00 07:00


 


Intake Total 1000 ml 360 ml


 


Balance 1000 ml 360 ml


 


  


 


Intake Oral  360 ml


 


IV Total 1000 ml 


 


# Voids 1 2











Laboratory Tests








Test


  1/2/20


14:09 1/2/20


15:11 1/3/20


05:50


 


White Blood Count


  14.1 K/UL


(4.8-10.8)  H 


  Pending  


 


 


Red Blood Count


  2.86 M/UL


(4.70-6.10)  L 


  Pending  


 


 


Hemoglobin


  8.9 G/DL


(14.2-18.0)  L 


  Pending  


 


 


Hematocrit


  25.1 %


(42.0-52.0)  L 


  Pending  


 


 


Mean Corpuscular Volume 88 FL (80-99)    Pending  


 


Mean Corpuscular Hemoglobin


  31.3 PG


(27.0-31.0)  H 


  Pending  


 


 


Mean Corpuscular Hemoglobin


Concent 35.7 G/DL


(32.0-36.0) 


  Pending  


 


 


Red Cell Distribution Width


  10.8 %


(11.6-14.8)  L 


  Pending  


 


 


Platelet Count


  239 K/UL


(150-450) 


  Pending  


 


 


Mean Platelet Volume


  6.8 FL


(6.5-10.1) 


  Pending  


 


 


Neutrophils (%) (Auto)


  67.3 %


(45.0-75.0) 


  Pending  


 


 


Lymphocytes (%) (Auto)


  22.7 %


(20.0-45.0) 


  Pending  


 


 


Monocytes (%) (Auto)


  7.7 %


(1.0-10.0) 


  Pending  


 


 


Eosinophils (%) (Auto)


  1.7 %


(0.0-3.0) 


  Pending  


 


 


Basophils (%) (Auto)


  0.7 %


(0.0-2.0) 


  Pending  


 


 


Prothrombin Time


  10.0 SEC


(9.30-11.50) 


  


 


 


Prothromb Time International


Ratio 0.9 (0.9-1.1)  


  


  


 


 


Activated Partial


Thromboplast Time 21 SEC (23-33)


L 


  


 


 


Sodium Level


  142 MMOL/L


(136-145) 


  Pending  


 


 


Potassium Level


  3.8 MMOL/L


(3.5-5.1) 


  Pending  


 


 


Chloride Level


  105 MMOL/L


() 


  Pending  


 


 


Carbon Dioxide Level


  24 MMOL/L


(21-32) 


  Pending  


 


 


Anion Gap


  13 mmol/L


(5-15) 


  


 


 


Blood Urea Nitrogen


  40 mg/dL


(7-18)  H 


  Pending  


 


 


Creatinine


  1.2 MG/DL


(0.55-1.30) 


  Pending  


 


 


Estimat Glomerular Filtration


Rate > 60 mL/min


(>60) 


  Pending  


 


 


Glucose Level


  181 MG/DL


()  H 


  Pending  


 


 


Hemoglobin A1c


  7.0 %


(4.3-6.0)  H 


  


 


 


Calcium Level


  8.3 MG/DL


(8.5-10.1)  L 


  Pending  


 


 


Total Bilirubin


  0.2 MG/DL


(0.2-1.0) 


  


 


 


Aspartate Amino Transf


(AST/SGOT) 15 U/L (15-37)


  


  


 


 


Alanine Aminotransferase


(ALT/SGPT) 47 U/L (12-78)


  


  


 


 


Alkaline Phosphatase


  57 U/L


() 


  


 


 


Troponin I


  0.000 ng/mL


(0.000-0.056) 


  


 


 


Total Protein


  6.6 G/DL


(6.4-8.2) 


  


 


 


Albumin


  3.4 G/DL


(3.4-5.0) 


  


 


 


Globulin 3.2 g/dL    


 


Albumin/Globulin Ratio 1.1 (1.0-2.7)    


 


Lipase


  194 U/L


() 


  


 


 


Thyroid Stimulating Hormone


(TSH) 5.143 uiU/mL


(0.358-3.740) 


  


 


 


Serum Alcohol < 3 mg/dL    


 


Urine Color  Yellow   


 


Urine Appearance  Clear   


 


Urine pH  5 (4.5-8.0)   


 


Urine Specific Gravity


  


  1.015


(1.005-1.035) 


 


 


Urine Protein


  


  1+ (NEGATIVE)


H 


 


 


Urine Glucose (UA)


  


  Negative


(NEGATIVE) 


 


 


Urine Ketones


  


  Negative


(NEGATIVE) 


 


 


Urine Blood


  


  Negative


(NEGATIVE) 


 


 


Urine Nitrite


  


  Negative


(NEGATIVE) 


 


 


Urine Bilirubin


  


  Negative


(NEGATIVE) 


 


 


Urine Urobilinogen


  


  Normal MG/DL


(0.0-1.0) 


 


 


Urine Leukocyte Esterase


  


  1+ (NEGATIVE)


H 


 


 


Urine RBC


  


  0-2 /HPF (0 -


0)  H 


 


 


Urine WBC


  


  2-4 /HPF (0 -


0) 


 


 


Urine Squamous Epithelial


Cells 


  Few /LPF


(NONE/OCC) 


 


 


Urine Bacteria


  


  Few /HPF


(NONE) 


 


 


Triglycerides Level   Pending  


 


Cholesterol Level   Pending  


 


LDL Cholesterol   Pending  


 


HDL Cholesterol   Pending  


 


Cholesterol/HDL Ratio   Pending  








Height (Feet):  5


Height (Inches):  8.00


Weight (Pounds):  145


Medications





Current Medications








 Medications


  (Trade)  Dose


 Ordered  Sig/Jacky


 Route


 PRN Reason  Start Time


 Stop Time Status Last Admin


Dose Admin


 


 Acetaminophen


  (Tylenol)  650 mg  Q6H  PRN


 ORAL


 Mild Pain/Temp > 100.5  1/2/20 20:30


 2/1/20 20:29   


 


 


 Dextrose


  (Dextrose 50%)  25 ml  Q30M  PRN


 IV


 Hypoglycemia  1/2/20 20:00


 2/1/20 19:59   


 


 


 Dextrose


  (Dextrose 50%)  50 ml  Q30M  PRN


 IV


 Hypoglycemia  1/2/20 20:00


 2/1/20 19:59   


 


 


 Insulin Aspart


  (NovoLOG)    BEFORE MEALS AND  HS


 SUBQ


   1/2/20 22:00


 2/1/20 21:59  1/2/20 22:35


 


 


 Lisinopril


  (ZestriL)  5 mg  DAILY


 ORAL


   1/3/20 09:00


 2/2/20 08:59   


 


 


 Pantoprazole


  (Protonix)  40 mg  EVERY 12  HOURS


 IVP


   1/2/20 21:00


 2/1/20 20:59  1/2/20 22:16


 


 


 Temazepam


  (Restoril)  15 mg  HSPRN  PRN


 ORAL


 Insomnia  1/2/20 20:30


 1/9/20 20:29   


 











Assessment/Plan


Assessment/Plan:


Hematology Consultation





SOLEDAD ESPINOZA: Maegan Todd


[facility]: Anemia evaluation


DOS: 1/3/20


Source:  Patient, EMS





HPI


Patient is a 64-year-old male DM2, HTN, HL brought in by EMS after increased 

hematemesis.  Patient reportedly had been vomiting dark bloody material.  He 

reports having a prior history of diabetes as well as hypertension.  He denies 

any alcohol use since December 24.  Patient was sent in from a urgent care 

where he was noted to have some coffee-ground emesis approximately 200 cc. Has 

been seen by Gi and further recs to follow. Cbc was ordered this am and matt Rn. 

Currently hematemesis resolved.





Coded Allergies:  


     No Known Allergies (Unverified , 1/2/20)





Nursing Documentation:  PMH: Agreed; PSxH: Agreed





Past Med Hx:  DM2, HTN, HL 





Active Scripts








 Medications  Dose


 Route/Sig


 Max Daily Dose Days Date Category


 


 Lisinopril*


  (Lisinopril) 5 Mg


 Tablet  Unknown Dose


 ORAL DAILY


    1/2/20 Reported


 


 Metformin Hcl*


  (Metformin HCl)


 500 Mg Tablet  500 Mg


 ORAL DAILY


    1/2/20 Reported





Fam hx: no hx of cancer or heart disease





Surg hx: none





Social hx: drinks very little, continues to smoke, no hard drugs, worked at 

[employer], is single, has 2 kids





ROS: negative except mentioned in HPI





PE


Vitals:  reviewed, normal


Gen:  normal inspection, alert, GCS 15


HEENT:  atraumatic


Pulm:  normal inspection, lungs clear, normal breath sounds, no respiratory 

distress, no retraction, no wheezing


Cardiovascular:  regular rate, rhythm, no edema


GI:  normal inspection, normal bowel sounds, non tender, soft, no guarding, no 

hernia


:  no CVA tenderness


Skin:  no rash





Labs: reviewed


 


Assessment and recs:


# Anemia due to Upper GI bleeding


--> currently appears to have resolved symptomatically


--> pls obtain consult with gi for further recs re endoscopy


--> anemia panel is pending, has been ordered


--> no e/o hemolysis currently noted


--> monitor for withdrawal


# Leukocytosis is 2/2 reactive process, r/o infection


--> cxr reviewed and no infection is noted


--> abx as needed


# HTn - with elev sbp upon admission


--> sbp goal <140


--> as per cards eval


--> on lisinopril


# HL - lipitor as needed


--> per cards prn


# DM2, on metformin


--> obtain hgb a1c


# Dvt ppx scds





Appreciate consultation and dw Rn











Tai Perez MD Kurtis 3, 2020 07:09

## 2020-01-03 NOTE — NUR
NURSE NOTES:

Dr. Todd paged to clarify the frequency of Metformin. Per MD, follow the orders with TID

## 2020-01-03 NOTE — NUR
CASE MANAGEMENT:REVIEW



64 YR OLD MALE BIBA FROM HOME



CC; VOMITING BLOOD. ABDOMINAL PAIN



SI: UGIB. ANEMIA

98.0   84  16   100/80  98% ON RA

WBC+14.1   H/H-8.9/25.1



IS: 1L NS BOLUS

IV PROTONIX

CHEST XRAY

**: TO TELEMETRY 

DCP: RETURN HOME

## 2020-01-04 VITALS — DIASTOLIC BLOOD PRESSURE: 80 MMHG | SYSTOLIC BLOOD PRESSURE: 140 MMHG

## 2020-01-04 VITALS — SYSTOLIC BLOOD PRESSURE: 136 MMHG | DIASTOLIC BLOOD PRESSURE: 74 MMHG

## 2020-01-04 VITALS — SYSTOLIC BLOOD PRESSURE: 130 MMHG | DIASTOLIC BLOOD PRESSURE: 84 MMHG

## 2020-01-04 LAB
ADD MANUAL DIFF: NO
ANION GAP SERPL CALC-SCNC: 8 MMOL/L (ref 5–15)
BASOPHILS NFR BLD AUTO: 0.9 % (ref 0–2)
BUN SERPL-MCNC: 15 MG/DL (ref 7–18)
CALCIUM SERPL-MCNC: 7.9 MG/DL (ref 8.5–10.1)
CHLORIDE SERPL-SCNC: 105 MMOL/L (ref 98–107)
CO2 SERPL-SCNC: 27 MMOL/L (ref 21–32)
CREAT SERPL-MCNC: 1.1 MG/DL (ref 0.55–1.3)
EOSINOPHIL NFR BLD AUTO: 3.8 % (ref 0–3)
ERYTHROCYTE [DISTWIDTH] IN BLOOD BY AUTOMATED COUNT: 11.6 % (ref 11.6–14.8)
HCT VFR BLD CALC: 24.1 % (ref 42–52)
HGB BLD-MCNC: 8.6 G/DL (ref 14.2–18)
LYMPHOCYTES NFR BLD AUTO: 32.2 % (ref 20–45)
MCV RBC AUTO: 89 FL (ref 80–99)
MONOCYTES NFR BLD AUTO: 8.6 % (ref 1–10)
NEUTROPHILS NFR BLD AUTO: 54.5 % (ref 45–75)
PLATELET # BLD: 151 K/UL (ref 150–450)
POTASSIUM SERPL-SCNC: 3.9 MMOL/L (ref 3.5–5.1)
RBC # BLD AUTO: 2.7 M/UL (ref 4.7–6.1)
SODIUM SERPL-SCNC: 140 MMOL/L (ref 136–145)
WBC # BLD AUTO: 7.2 K/UL (ref 4.8–10.8)

## 2020-01-04 RX ADMIN — INSULIN ASPART SCH UNITS: 100 INJECTION, SOLUTION INTRAVENOUS; SUBCUTANEOUS at 05:59

## 2020-01-04 RX ADMIN — METFORMIN HYDROCHLORIDE SCH MG: 500 TABLET ORAL at 05:53

## 2020-01-04 NOTE — NUR
AMA:

Patient insisting of leaving hospital against medical advice. Explained Risks and 
consequences of AMA. patient still insist. Next of kin Brother made aware of AMA. Patient 
ambulated off the floor with steady gait. VSS. Patient signed AMA. All belongings taken by 
patient. heart monitor, IS band and IV removed from patient. Dr. Perez also aware of AMA

## 2020-01-04 NOTE — NUR
NURSE NOTES:

Received patient from Alayna Keating. Patient is awake lying comfortably in bed. No complain of 
pain or discomfort. Patient wishing to go home today will notify MD. fall precautions in 
place. Will follow.

## 2020-01-04 NOTE — NUR
NURSE NOTES:

Dr. Crump on the floor aware patient made aware wanted wanted to go home today. per MD if 
patient wanted to leave he has to go AMA.

## 2020-01-07 NOTE — DISCHARGE SUMMARY
Discharge Summary


Discharge Summary


_


DATE OF ADMISSION: 1/2/2020





DATE OF DISCHARGE: 1/4/2020





Patient left AGAINST MEDICAL ADVICE  





REASON FOR ADMISSION: 


64 years old male with past medical history of diabetes mellitus and 

hypertension, brought to emergency department with hematemesis.  


Patient apparently was vomiting dark bloody material.


Patient denied alcohol use since December 24.  


Laboratory work-up revealed leukocytosis WBC 14.1 hemoglobin 8.9,  hematocrit 

25.1.   


No fevers.


INR 0.9.  


Stable electrolytes.  


BUN 40, creatinine 1.2.  


Glucose 181.  AST 15 ALT 47.  


Troponin negative.  


Urinalysis revealed +1 protein , +1 leukocyte esterase, no pyuria, no bacteria.


Chest x-ray revealed no evidence of infection.


Patient subsequently admitted for upper GI bleeding and anemia





CONSULTANTS:


GI specialist Dr. Hernandez


hematologist/oncologist Dr. Perez 


 


 


[facility] COURSE: 


Patient admitted to telemetry floor.  


Patient was kept n.p.o. and started on the IV fluids and IV Protonix every 12 

hours.  


GI specialist consulted.  


Hemoglobin and hematocrit were closely monitored with goal to keep hemoglobin 

above 7.  


On the day of discharge of leaving hemoglobin 8.6,  hematocrit 24.1.





GI specialist followed.  


Patient was scheduled for EGD on  1/3, but accidentally was given food by the 

nursling staff.  


Subsequently EGD was  postponed till 1/6.  


Symptomatic treatment provided.  


Antiemetic were on board as needed.  


The patient was able to tolerate diet .


No further hematemesis.  


Initial leukocytosis resolved.  





Per hematologist , leukocytosis most likely  was reactive , given that the  

chest x-ray was negative  and urinalysis revealed no evidence of infection .


Fever resolved and leukocytosis resolved.  


Acute anemia was likely due to upper GI bleeding.  


Blood pressure was managed with ACE inhibitor , and remained stable.  


Blood sugar was managed with metformin.  


DVT prophylaxis provided.  


Noted elevated TSH.  


Patient started on low-dose of levothyroxine.  Repeat thyroid function test in 

1 month.  


In the morning   of 1/4 patient decided to leave AGAINST MEDICAL ADVICE. 


The risks and consequences of signing AGAINST MEDICAL ADVICE were discussed 

with patient in detail.  


Patient verbalized understanding, nevertheless signed AMA form and left.











FINAL DIAGNOSES: 


Upper GI bleeding


Acute anemia secondary to upper  GI bleeding


Diabetes mellitus


Hypertension


Hypothyroidism


Leukocytosis, likely reactive -resolved 


 





I have been assigned to dictate discharge summary for this account. 


I was not involved in the patient's management.











Marisa Dominguez NP Jan 7, 2020 08:38